# Patient Record
Sex: FEMALE | Race: WHITE | Employment: FULL TIME | URBAN - METROPOLITAN AREA
[De-identification: names, ages, dates, MRNs, and addresses within clinical notes are randomized per-mention and may not be internally consistent; named-entity substitution may affect disease eponyms.]

---

## 2022-09-07 ENCOUNTER — OFFICE VISIT (OUTPATIENT)
Dept: OCCUPATIONAL MEDICINE | Age: 47
End: 2022-09-07

## 2022-09-07 VITALS
TEMPERATURE: 98.4 F | DIASTOLIC BLOOD PRESSURE: 82 MMHG | RESPIRATION RATE: 16 BRPM | SYSTOLIC BLOOD PRESSURE: 124 MMHG | HEART RATE: 54 BPM | OXYGEN SATURATION: 99 %

## 2022-09-07 DIAGNOSIS — J31.0 RHINOSINUSITIS: ICD-10-CM

## 2022-09-07 DIAGNOSIS — U07.1 COVID: Primary | ICD-10-CM

## 2022-09-07 DIAGNOSIS — J32.9 RHINOSINUSITIS: ICD-10-CM

## 2022-09-07 LAB
INFLUENZA A ANTIGEN, POC: NEGATIVE
INFLUENZA B ANTIGEN, POC: NEGATIVE
SARS-COV-2 RNA, POC: POSITIVE

## 2022-09-07 RX ORDER — DIPHENHYDRAMINE HCL 25 MG
25 TABLET ORAL EVERY 6 HOURS PRN
COMMUNITY

## 2022-09-07 RX ORDER — FEXOFENADINE HCL AND PSEUDOEPHEDRINE HCI 60; 120 MG/1; MG/1
1 TABLET, EXTENDED RELEASE ORAL 2 TIMES DAILY
COMMUNITY
Start: 2022-06-01 | End: 2022-09-07

## 2022-09-07 RX ORDER — IBUPROFEN 200 MG
200 TABLET ORAL EVERY 6 HOURS PRN
COMMUNITY

## 2022-09-07 ASSESSMENT — ENCOUNTER SYMPTOMS
ABDOMINAL PAIN: 0
WHEEZING: 0
SHORTNESS OF BREATH: 0
VOMITING: 0
EYE ITCHING: 0
SINUS PRESSURE: 1
SINUS PAIN: 1
SWOLLEN GLANDS: 1
NAUSEA: 1
COUGH: 1
RHINORRHEA: 1
DIARRHEA: 0
EYE PAIN: 0
EYE DISCHARGE: 0
EYE REDNESS: 0
SORE THROAT: 1

## 2022-09-07 NOTE — PROGRESS NOTES
and vomiting. Genitourinary:  Negative for dysuria. Musculoskeletal:  Negative for joint pain and neck pain. Skin:  Negative for rash. Neurological:  Positive for light-headedness and headaches. Negative for dizziness. OBJECTIVE:  /82 (Site: Left Upper Arm, Position: Sitting, Cuff Size: Medium Adult)   Pulse 54   Temp 98.4 °F (36.9 °C) (Oral)   Resp 16   SpO2 99%      Results for orders placed or performed in visit on 09/07/22   AMB POC SARS-COV-2 AND INFLUENZA A/B   Result Value Ref Range    SARS-COV-2 RNA, POC Positive     Influenza A Antigen, POC Negative Negative    Influenza B Antigen, POC Negative Negative       Physical Exam  Vitals reviewed. Constitutional:       General: She is awake. She is not in acute distress. Appearance: Normal appearance. She is well-developed and well-groomed. HENT:      Head: Normocephalic. Right Ear: Hearing and external ear normal. A middle ear effusion is present. Left Ear: Hearing and external ear normal. A middle ear effusion is present. Nose: Mucosal edema present. Right Turbinates: Swollen. Left Turbinates: Swollen. Right Sinus: No maxillary sinus tenderness or frontal sinus tenderness. Left Sinus: No maxillary sinus tenderness or frontal sinus tenderness. Mouth/Throat:      Lips: Pink. No lesions. Mouth: Mucous membranes are moist.      Pharynx: Uvula midline. Pharyngeal swelling and posterior oropharyngeal erythema present. Tonsils: No tonsillar exudate. Eyes:      General: Lids are normal.      Conjunctiva/sclera: Conjunctivae normal.   Neck:      Trachea: No tracheal deviation. Cardiovascular:      Rate and Rhythm: Normal rate and regular rhythm. Heart sounds: Normal heart sounds. Pulmonary:      Effort: Pulmonary effort is normal.      Breath sounds: Normal breath sounds. Musculoskeletal:      Cervical back: Neck supple.    Lymphadenopathy:      Head:      Right side of head: Submandibular adenopathy present. No submental, tonsillar, preauricular, posterior auricular or occipital adenopathy. Left side of head: Submandibular adenopathy present. No submental, tonsillar, preauricular, posterior auricular or occipital adenopathy. Neurological:      Mental Status: She is alert and oriented to person, place, and time. Psychiatric:         Behavior: Behavior is cooperative. ASSESSMENT and PLAN    Beatriz was seen today for uri. Diagnoses and all orders for this visit:    COVID  Comments:  Encouraged rest, hydration, OTC cold medications per packaging, and if needed may contact PCP or an urgent care for further treatement options if needed    Rhinosinusitis  -     AMB POC SARS-COV-2 AND INFLUENZA A/B    Discussed that HR notification of result is necessary for medical surveillance during a pandemic state  in order to complete proper disinfection techniques and contact tracing processes if results are positive    Counseled on benefits of having a primary care provider which includes, but is not limited to, continuity of care and having a medical home when concerns arise. Also enforced that onsite clinic policy states that we are not to take the place of a primary care provider, pt verbalized understanding. SEs and risk vs benefits associated with medications prescribed discussed with patient who verbalized understanding. Pt verbalized understanding and agreement with plan of care. RTC for persisting/worsening symptoms or new complaints that arise. Discussed signs and symptoms that would warrant immediate evaluation including, but not limited to HA, blurred vision, speech disturbance, difficulty with ambulation/gait, numbness, tingling, weakness, syncope, chest pain, or shortness of breath. I have reviewed the patient's medication list, past medical, family, social, and surgical history in detail and updated the patient record appropriately.     Siobhan Began, APRN - CNP

## 2022-09-21 ENCOUNTER — OFFICE VISIT (OUTPATIENT)
Dept: OCCUPATIONAL MEDICINE | Age: 47
End: 2022-09-21

## 2022-09-21 DIAGNOSIS — Z20.822 COVID-19 RULED OUT BY LABORATORY TESTING: Primary | ICD-10-CM

## 2022-09-21 LAB
INFLUENZA A ANTIGEN, POC: NEGATIVE
INFLUENZA B ANTIGEN, POC: NEGATIVE
SARS-COV-2 RNA, POC: NEGATIVE

## 2022-12-09 ENCOUNTER — OFFICE VISIT (OUTPATIENT)
Dept: OCCUPATIONAL MEDICINE | Age: 47
End: 2022-12-09

## 2022-12-09 VITALS
TEMPERATURE: 98.3 F | RESPIRATION RATE: 16 BRPM | SYSTOLIC BLOOD PRESSURE: 134 MMHG | OXYGEN SATURATION: 99 % | DIASTOLIC BLOOD PRESSURE: 84 MMHG | HEART RATE: 88 BPM

## 2022-12-09 DIAGNOSIS — M54.50 ACUTE RIGHT-SIDED LOW BACK PAIN WITHOUT SCIATICA: Primary | ICD-10-CM

## 2022-12-09 LAB
BILIRUBIN, URINE, POC: NEGATIVE
BLOOD URINE, POC: NEGATIVE
GLUCOSE URINE, POC: NEGATIVE
KETONES, URINE, POC: NEGATIVE
LEUKOCYTE ESTERASE, URINE, POC: NEGATIVE
NITRITE, URINE, POC: NEGATIVE
PH, URINE, POC: 5 (ref 4.6–8)
PROTEIN,URINE, POC: NEGATIVE
SPECIFIC GRAVITY, URINE, POC: 1.03 (ref 1–1.03)
URINALYSIS CLARITY, POC: CLEAR
URINALYSIS COLOR, POC: NORMAL
UROBILINOGEN, POC: NORMAL

## 2022-12-09 RX ORDER — CYCLOBENZAPRINE HCL 5 MG
5 TABLET ORAL NIGHTLY PRN
Qty: 3 TABLET | Refills: 0 | Status: SHIPPED | OUTPATIENT
Start: 2022-12-09 | End: 2022-12-12

## 2022-12-09 RX ORDER — NAPROXEN 500 MG/1
500 TABLET ORAL 2 TIMES DAILY PRN
Qty: 30 TABLET | Refills: 0 | Status: SHIPPED | OUTPATIENT
Start: 2022-12-09

## 2022-12-09 ASSESSMENT — ENCOUNTER SYMPTOMS
ABDOMINAL PAIN: 0
BACK PAIN: 1
BOWEL INCONTINENCE: 0

## 2022-12-09 NOTE — PROGRESS NOTES
PROGRESS NOTE    SUBJECTIVE:   Yoselin Schmidt is a 49 y.o. female seen for low back pain that started Wednesday. States that the day prior to her pain starting she was carrying the [employer] and helping to set up the work [employer]. Describes pain as a constant ache with a catching feeling with certain movements. Denies fever, chills, CP, SHOB, HA, N/V, numbness/tingling/weakness in face/extremities, stumbling/falls with ambulation since injury, visual changes, UTI symptoms, or new bowel/bladder incontinence. Chief Complaint    Lower Back Pain         Back Pain  This is a new problem. Episode onset: 2 days. The problem has been gradually worsening since onset. The pain is present in the gluteal and lumbar spine. The pain is moderate. The symptoms are aggravated by sitting and standing. Stiffness is present All day. Pertinent negatives include no abdominal pain, bladder incontinence, bowel incontinence, chest pain, dysuria, fever, headaches, leg pain, numbness, paresis, paresthesias, pelvic pain, perianal numbness, tingling, weakness or weight loss. She has tried NSAIDs for the symptoms. The treatment provided mild relief. Current Outpatient Medications   Medication Sig Dispense Refill    naproxen (NAPROSYN) 500 MG tablet Take 1 tablet by mouth 2 times daily as needed for Pain 30 tablet 0    cyclobenzaprine (FLEXERIL) 5 MG tablet Take 1 tablet by mouth nightly as needed for Muscle spasms 3 tablet 0    ibuprofen (ADVIL;MOTRIN) 200 MG tablet Take 200 mg by mouth every 6 hours as needed for Pain      diphenhydrAMINE (BENADRYL) 25 MG tablet Take 25 mg by mouth every 6 hours as needed for Itching       No current facility-administered medications for this visit.       Allergies   Allergen Reactions    Penicillins Hives    Codeine Other (See Comments) and Palpitations     Other reaction(s): Unknown (comments)       Social History     Tobacco Use    Smoking status: Light Smoker    Smokeless tobacco: Never    Tobacco comments:     Quit smoking: 10 cigs/week   Substance Use Topics    Alcohol use: Yes    Drug use: No        Review of Systems   Constitutional:  Negative for chills, fatigue, fever and weight loss. Eyes:  Negative for visual disturbance. Cardiovascular:  Negative for chest pain. Gastrointestinal:  Negative for abdominal pain and bowel incontinence. Genitourinary:  Negative for bladder incontinence, dysuria and pelvic pain. Musculoskeletal:  Positive for back pain. Negative for arthralgias and myalgias. Neurological:  Negative for dizziness, tingling, weakness, light-headedness, numbness, headaches and paresthesias. OBJECTIVE:  /84 (Site: Left Upper Arm, Position: Sitting, Cuff Size: Medium Adult)   Pulse 88   Temp 98.3 °F (36.8 °C) (Oral)   Resp 16   SpO2 99%      Results for orders placed or performed in visit on 12/09/22   AMB POC URINALYSIS DIP STICK MANUAL W/O MICRO   Result Value Ref Range    Color (UA POC) Beatriz     Clarity (UA POC) Clear     Glucose, Urine, POC Negative Negative    Bilirubin, Urine, POC Negative Negative    Ketones, Urine, POC Negative Negative    Specific Gravity, Urine, POC 1.030 1.001 - 1.035    Blood (UA POC) Negative Negative    pH, Urine, POC 5.0 4.6 - 8.0    Protein, Urine, POC Negative Negative    Urobilinogen, POC 0.2 mg/dL     Nitrite, Urine, POC Negative Negative    Leukocyte Esterase, Urine, POC Negative Negative       Physical Exam  Vitals reviewed. Constitutional:       General: She is awake. She is not in acute distress. Appearance: Normal appearance. She is well-developed and well-groomed. HENT:      Head: Normocephalic. Eyes:      Pupils: Pupils are equal, round, and reactive to light. Pupils are equal.      Right eye: Pupil is not sluggish. Left eye: Pupil is not sluggish. Funduscopic exam:     Right eye: Red reflex present. Left eye: Red reflex present.   Cardiovascular:      Rate and Rhythm: Normal rate and regular rhythm. Heart sounds: Normal heart sounds. Pulmonary:      Effort: Pulmonary effort is normal.      Breath sounds: Normal breath sounds. Musculoskeletal:      Cervical back: Neck supple. No swelling, edema, deformity, erythema, signs of trauma, lacerations, rigidity, spasms, torticollis, tenderness, bony tenderness or crepitus. No pain with movement. Normal range of motion. Thoracic back: No swelling, edema, deformity, signs of trauma, lacerations, spasms, tenderness or bony tenderness. Normal range of motion. No scoliosis. Lumbar back: Spasms and tenderness present. No swelling, edema, deformity, signs of trauma, lacerations or bony tenderness. Normal range of motion. Negative right straight leg raise test and negative left straight leg raise test. No scoliosis. Comments: low back musculature contracted on right side, area is TTP; negative straight leg raise; ROM intact, pain with forward flexion, lateral flexion, and lateral rotation; no pain with palpation, crepitus, or misalignment noted over IS joints or bilateral trochanters   Skin:     General: Skin is warm and dry. Neurological:      Mental Status: She is alert and oriented to person, place, and time. Sensory: Sensation is intact. Motor: Motor function is intact. No weakness. Coordination: Coordination is intact. Coordination normal.      Gait: Gait is intact. Gait normal.   Psychiatric:         Behavior: Behavior is cooperative. ASSESSMENT and PLAN    Beatriz was seen today for lower back pain. Diagnoses and all orders for this visit:    Acute right-sided low back pain without sciatica  Comments:  Naproxen and Flexeril as presribed DO NOT DRIVE, OPERATE HEAVY MACHINERY, OR DRINK ALCOHOL WHILE TAKING*   Orders:  -     naproxen (NAPROSYN) 500 MG tablet; Take 1 tablet by mouth 2 times daily as needed for Pain  -     cyclobenzaprine (FLEXERIL) 5 MG tablet;  Take 1 tablet by mouth nightly as needed for Muscle spasms  -     AMB POC URINALYSIS DIP STICK MANUAL W/O MICRO    Encouraged continuing RICE and/or heat therapy, lumbar support, massage therapy, and OTC muscle rub use for symptom management. Back stretching and strengthening exercises provided. Discussed proper body mechanics to prevent further injury of the musculature    Counseled on benefits of having a primary care provider which includes, but is not limited to, continuity of care and having a medical home when concerns arise. Also enforced that onsite clinic policy states that we are not to take the place of a primary care provider, pt verbalized understanding. SEs and risk vs benefits associated with medications prescribed discussed with patient who verbalized understanding. Pt verbalized understanding and agreement with plan of care. RTC for persisting/worsening symptoms or new complaints that arise. Discussed signs and symptoms that would warrant immediate evaluation including, but not limited to HA, blurred vision, speech disturbance, difficulty with ambulation/gait, numbness, tingling, weakness, syncope, chest pain, or shortness of breath. I have reviewed the patient's medication list, past medical, family, social, and surgical history in detail and updated the patient record appropriately.     Reji Aquino, APRN - CNP

## 2023-01-30 ENCOUNTER — OFFICE VISIT (OUTPATIENT)
Dept: OCCUPATIONAL MEDICINE | Age: 48
End: 2023-01-30

## 2023-01-30 VITALS
OXYGEN SATURATION: 99 % | RESPIRATION RATE: 16 BRPM | DIASTOLIC BLOOD PRESSURE: 72 MMHG | TEMPERATURE: 98.2 F | SYSTOLIC BLOOD PRESSURE: 116 MMHG | HEART RATE: 73 BPM

## 2023-01-30 DIAGNOSIS — R51.9 ACUTE NONINTRACTABLE HEADACHE, UNSPECIFIED HEADACHE TYPE: Primary | ICD-10-CM

## 2023-01-30 ASSESSMENT — ENCOUNTER SYMPTOMS
VOMITING: 0
SINUS PRESSURE: 0
SORE THROAT: 0
EYE REDNESS: 0
EYE PAIN: 0
EYE ITCHING: 0
SHORTNESS OF BREATH: 0
NAUSEA: 0
RHINORRHEA: 0
SINUS PAIN: 0
DIARRHEA: 0
EYE DISCHARGE: 0

## 2023-01-30 NOTE — PROGRESS NOTES
PROGRESS NOTE    SUBJECTIVE:   Saúl Ballard is a 49 y.o. female seen for COVID testing as she has had a headache and started to [employer]" today which are similar symptoms to what she experienced with COVID. Multiple coworkers out with [place] recently and desires testing. Feels like her sense of smell is still impaired since having COVID so is unsure if this is from lingering post-COVID symptoms or a sign of a potential new infection. Chief Complaint    Concern For COVID-19         Headache  Quality:  Dull  Laterality:  Both sides at the same time  Aggravating factors:  None  Allodynia triggers:  None  Changes in vision:  None    Current Outpatient Medications   Medication Sig Dispense Refill    diphenhydrAMINE (BENADRYL) 25 MG tablet Take 25 mg by mouth every 6 hours as needed for Itching       No current facility-administered medications for this visit. Allergies   Allergen Reactions    Penicillins Hives    Codeine Other (See Comments) and Palpitations     Other reaction(s): Unknown (comments)       Social History     Tobacco Use    Smoking status: Light Smoker    Smokeless tobacco: Never    Tobacco comments:     Quit smoking: 10 cigs/week   Substance Use Topics    Alcohol use: Yes    Drug use: No        Review of Systems   Constitutional:  Negative for chills and fatigue. HENT:  Negative for congestion, ear pain, postnasal drip, rhinorrhea, sinus pressure, sinus pain, sneezing and sore throat. Eyes:  Negative for pain, discharge, redness and itching. Respiratory:  Negative for shortness of breath. Cardiovascular:  Negative for chest pain. Gastrointestinal:  Negative for diarrhea, nausea and vomiting. Musculoskeletal:  Negative for arthralgias and myalgias. Neurological:  Positive for headaches. Negative for dizziness and light-headedness.         OBJECTIVE:  /72 (Site: Left Upper Arm, Position: Sitting, Cuff Size: Medium Adult)   Pulse 73   Temp 98.2 °F (36.8 °C) (Oral)   Resp 16 SpO2 99%      Results for orders placed or performed in visit on 01/30/23   AMB POC SARS-COV-2 AND INFLUENZA A/B   Result Value Ref Range    SARS-COV-2 RNA, POC Negative     Influenza A Antigen, POC Negative Negative    Influenza B Antigen, POC Negative Negative       Physical Exam  Vitals reviewed. Constitutional:       General: She is awake. She is not in acute distress. Appearance: Normal appearance. She is well-developed and well-groomed. HENT:      Head: Normocephalic. Right Ear: Hearing, ear canal and external ear normal. No drainage, swelling or tenderness. No middle ear effusion. Left Ear: Hearing, ear canal and external ear normal. No drainage, swelling or tenderness. No middle ear effusion. Nose: No mucosal edema or rhinorrhea. Right Turbinates: Not swollen or pale. Left Turbinates: Not swollen or pale. Right Sinus: No maxillary sinus tenderness or frontal sinus tenderness. Left Sinus: No maxillary sinus tenderness or frontal sinus tenderness. Mouth/Throat:      Mouth: Mucous membranes are moist.      Pharynx: Oropharynx is clear. Uvula midline. No pharyngeal swelling or posterior oropharyngeal erythema. Tonsils: No tonsillar exudate. Comments: PND with cobblestoning  Eyes:      General: Lids are normal. No allergic shiner. Conjunctiva/sclera: Conjunctivae normal.      Right eye: Right conjunctiva is not injected. No chemosis. Left eye: Left conjunctiva is not injected. No chemosis. Neck:      Thyroid: No thyromegaly. Trachea: Trachea normal.   Cardiovascular:      Rate and Rhythm: Normal rate and regular rhythm. Heart sounds: Normal heart sounds. Pulmonary:      Effort: Pulmonary effort is normal.      Breath sounds: Normal breath sounds. Musculoskeletal:      Cervical back: Normal range of motion and neck supple.    Lymphadenopathy:      Head:      Right side of head: No submental, submandibular, tonsillar, preauricular, posterior auricular or occipital adenopathy. Left side of head: No submental, submandibular, tonsillar, preauricular, posterior auricular or occipital adenopathy. Skin:     General: Skin is warm and dry. Neurological:      Mental Status: She is alert and oriented to person, place, and time. Psychiatric:         Behavior: Behavior is cooperative. ASSESSMENT and PLAN    Beatriz was seen today for concern for covid-19. Diagnoses and all orders for this visit:    Acute nonintractable headache, unspecified headache type  Comments:  COVID/flu negative at this time. Encouraged to monitor for changes in her symptoms and RTC as needed  Orders:  -     AMB POC SARS-COV-2 AND INFLUENZA A/B      Counseled on benefits of having a primary care provider which includes, but is not limited to, continuity of care and having a medical home when concerns arise. Also enforced that onsite clinic policy states that we are not to take the place of a primary care provider, pt verbalized understanding. SEs and risk vs benefits associated with medications prescribed discussed with patient who verbalized understanding. Pt verbalized understanding and agreement with plan of care. RTC for persisting/worsening symptoms or new complaints that arise. Discussed signs and symptoms that would warrant immediate evaluation including, but not limited to HA, blurred vision, speech disturbance, difficulty with ambulation/gait, numbness, tingling, weakness, syncope, chest pain, or shortness of breath. I have reviewed the patient's medication list, past medical, family, social, and surgical history in detail and updated the patient record appropriately.     Ziggy Lane, RADHA - CNP

## 2023-02-21 ENCOUNTER — OFFICE VISIT (OUTPATIENT)
Dept: OCCUPATIONAL MEDICINE | Age: 48
End: 2023-02-21

## 2023-02-21 VITALS
DIASTOLIC BLOOD PRESSURE: 80 MMHG | HEART RATE: 82 BPM | OXYGEN SATURATION: 98 % | SYSTOLIC BLOOD PRESSURE: 136 MMHG | RESPIRATION RATE: 16 BRPM | TEMPERATURE: 98.2 F

## 2023-02-21 DIAGNOSIS — J30.9 ALLERGIC RHINITIS WITH POSTNASAL DRIP: ICD-10-CM

## 2023-02-21 DIAGNOSIS — J32.9 RHINOSINUSITIS: Primary | ICD-10-CM

## 2023-02-21 DIAGNOSIS — R09.82 ALLERGIC RHINITIS WITH POSTNASAL DRIP: ICD-10-CM

## 2023-02-21 DIAGNOSIS — J31.0 RHINOSINUSITIS: Primary | ICD-10-CM

## 2023-02-21 LAB
GROUP A STREP ANTIGEN, POC: NEGATIVE
INFLUENZA A ANTIGEN, POC: NEGATIVE
INFLUENZA B ANTIGEN, POC: NEGATIVE
SARS-COV-2 RNA, POC: NEGATIVE
VALID INTERNAL CONTROL, POC: YES

## 2023-02-21 ASSESSMENT — ENCOUNTER SYMPTOMS
NAUSEA: 0
CHEST TIGHTNESS: 0
SINUS PAIN: 1
RHINORRHEA: 1
COUGH: 1
EYE REDNESS: 0
SORE THROAT: 1
SWOLLEN GLANDS: 1
EYE ITCHING: 0
ABDOMINAL PAIN: 0
EYE PAIN: 0
DIARRHEA: 1
SHORTNESS OF BREATH: 0
SINUS PRESSURE: 1
WHEEZING: 0
VOMITING: 0
EYE DISCHARGE: 0

## 2023-02-21 NOTE — PROGRESS NOTES
PROGRESS NOTE    SUBJECTIVE:   Tamie Ortega is a 49 y.o. female seen for \"a bad cold\" that started 2 days ago. Denies any known or suspected sick contacts   Chief Complaint    URI         URI   This is a new problem. Episode onset: 2-3 days. The problem has been gradually worsening. There has been no fever. Associated symptoms include congestion, coughing, diarrhea, headaches, rhinorrhea, sinus pain, sneezing, a sore throat and swollen glands. Pertinent negatives include no abdominal pain, chest pain, dysuria, ear pain, joint pain, joint swelling, nausea, neck pain, plugged ear sensation, rash, vomiting or wheezing. Treatments tried: Dayquil, Molly seltzer, Benadryl. The treatment provided mild relief. Current Outpatient Medications   Medication Sig Dispense Refill    diphenhydrAMINE (BENADRYL) 25 MG tablet Take 25 mg by mouth every 6 hours as needed for Itching       No current facility-administered medications for this visit. Allergies   Allergen Reactions    Penicillins Hives    Codeine Other (See Comments) and Palpitations     Other reaction(s): Unknown (comments)       Social History     Tobacco Use    Smoking status: Light Smoker    Smokeless tobacco: Never    Tobacco comments:     Quit smoking: 10 cigs/week   Substance Use Topics    Alcohol use: Yes    Drug use: No        Review of Systems   Constitutional:  Positive for fatigue. Negative for chills and fever. HENT:  Positive for congestion, postnasal drip, rhinorrhea, sinus pressure, sinus pain, sneezing and sore throat. Negative for ear pain. Eyes:  Negative for pain, discharge, redness and itching. Respiratory:  Positive for cough. Negative for chest tightness, shortness of breath and wheezing. Cardiovascular:  Negative for chest pain. Gastrointestinal:  Positive for diarrhea. Negative for abdominal pain, nausea and vomiting. Genitourinary:  Negative for dysuria.    Musculoskeletal:  Negative for arthralgias, joint pain, myalgias and neck pain. Skin:  Negative for rash. Neurological:  Positive for light-headedness and headaches. Negative for dizziness. OBJECTIVE:  /80 (Site: Left Upper Arm, Position: Sitting, Cuff Size: Medium Adult)   Pulse 82   Temp 98.2 °F (36.8 °C) (Oral)   Resp 16   SpO2 98%      Results for orders placed or performed in visit on 02/21/23   AMB POC RAPID STREP A   Result Value Ref Range    Valid Internal Control, POC yes     Group A Strep Antigen, POC Negative Negative   AMB POC SARS-COV-2 AND INFLUENZA A/B   Result Value Ref Range    SARS-COV-2 RNA, POC Negative     Influenza A Antigen, POC Negative Negative    Influenza B Antigen, POC Negative Negative         Physical Exam  Vitals reviewed. Constitutional:       General: She is awake. She is not in acute distress. Appearance: Normal appearance. She is well-developed and well-groomed. HENT:      Head: Normocephalic. Right Ear: Hearing and external ear normal. A middle ear effusion is present. Left Ear: Hearing and external ear normal. A middle ear effusion is present. Nose: Mucosal edema present. Right Turbinates: Swollen. Left Turbinates: Swollen. Right Sinus: No maxillary sinus tenderness or frontal sinus tenderness. Left Sinus: No maxillary sinus tenderness or frontal sinus tenderness. Mouth/Throat:      Lips: Pink. No lesions. Mouth: Mucous membranes are moist.      Pharynx: Uvula midline. Pharyngeal swelling and posterior oropharyngeal erythema present. Tonsils: No tonsillar exudate. Eyes:      General: Lids are normal.      Conjunctiva/sclera: Conjunctivae normal.   Neck:      Trachea: No tracheal deviation. Cardiovascular:      Rate and Rhythm: Normal rate and regular rhythm. Heart sounds: Normal heart sounds. Pulmonary:      Effort: Pulmonary effort is normal.      Breath sounds: Normal breath sounds. Musculoskeletal:      Cervical back: Neck supple.    Lymphadenopathy: Head:      Right side of head: Submandibular adenopathy present. No submental, tonsillar, preauricular, posterior auricular or occipital adenopathy. Left side of head: Submandibular adenopathy present. No submental, tonsillar, preauricular, posterior auricular or occipital adenopathy. Neurological:      Mental Status: She is alert and oriented to person, place, and time. Psychiatric:         Behavior: Behavior is cooperative. ASSESSMENT and PLAN    Beatriz was seen today for uri. Diagnoses and all orders for this visit:    Rhinosinusitis  Comments:  COVID/Flu/strep negative at this time. Encouraged rest, hydration, and OTC therapies per packaging for relief. RTC as needed, may need retesting in 24-48°  Orders:  -     AMB POC RAPID STREP A  -     AMB POC SARS-COV-2 AND INFLUENZA A/B    Allergic rhinitis with postnasal drip  Comments:  Restart daily Loratadine instead of Benadryl for better allergy management with rising tree pollen counts      Counseled on benefits of having a primary care provider which includes, but is not limited to, continuity of care and having a medical home when concerns arise. Also enforced that onsite clinic policy states that we are not to take the place of a primary care provider, pt verbalized understanding. SEs and risk vs benefits associated with medications prescribed discussed with patient who verbalized understanding. Pt verbalized understanding and agreement with plan of care. RTC for persisting/worsening symptoms or new complaints that arise. Discussed signs and symptoms that would warrant immediate evaluation including, but not limited to HA, blurred vision, speech disturbance, difficulty with ambulation/gait, numbness, tingling, weakness, syncope, chest pain, or shortness of breath. I have reviewed the patient's medication list, past medical, family, social, and surgical history in detail and updated the patient record appropriately.     Kianna Ferrera, APRN - CNP

## 2023-08-22 ENCOUNTER — OFFICE VISIT (OUTPATIENT)
Dept: OCCUPATIONAL MEDICINE | Age: 48
End: 2023-08-22

## 2023-08-22 VITALS
HEART RATE: 82 BPM | RESPIRATION RATE: 16 BRPM | DIASTOLIC BLOOD PRESSURE: 74 MMHG | TEMPERATURE: 98.4 F | SYSTOLIC BLOOD PRESSURE: 122 MMHG | OXYGEN SATURATION: 99 %

## 2023-08-22 DIAGNOSIS — J02.9 SORE THROAT: Primary | ICD-10-CM

## 2023-08-22 RX ORDER — LORATADINE 10 MG/1
10 TABLET ORAL DAILY
COMMUNITY

## 2023-08-22 ASSESSMENT — ENCOUNTER SYMPTOMS
EYE DISCHARGE: 0
ABDOMINAL PAIN: 0
DIARRHEA: 0
SWOLLEN GLANDS: 1
RHINORRHEA: 1
COUGH: 0
EYE REDNESS: 0
SORE THROAT: 1
EYE ITCHING: 0
NAUSEA: 0
EYE PAIN: 0
VOMITING: 0
SINUS PRESSURE: 0
SINUS PAIN: 0

## 2024-08-14 ENCOUNTER — OFFICE VISIT (OUTPATIENT)
Age: 49
End: 2024-08-14

## 2024-08-14 VITALS
TEMPERATURE: 98.7 F | DIASTOLIC BLOOD PRESSURE: 70 MMHG | HEART RATE: 66 BPM | OXYGEN SATURATION: 99 % | SYSTOLIC BLOOD PRESSURE: 124 MMHG | RESPIRATION RATE: 16 BRPM

## 2024-08-14 DIAGNOSIS — R55 VASOVAGAL EPISODE: Primary | ICD-10-CM

## 2024-08-14 RX ORDER — VALACYCLOVIR HYDROCHLORIDE 1 G/1
1000 TABLET, FILM COATED ORAL 2 TIMES DAILY
COMMUNITY
Start: 2024-08-01

## 2024-08-14 ASSESSMENT — ENCOUNTER SYMPTOMS
VOMITING: 0
PHOTOPHOBIA: 0
EYE ITCHING: 0
RHINORRHEA: 0
DIARRHEA: 1
FLATUS: 0
EYE DISCHARGE: 0
SINUS PAIN: 0
EYE PAIN: 0
EYE REDNESS: 0
BACK PAIN: 0
ABDOMINAL PAIN: 1
SORE THROAT: 0
BLOATING: 0
RECTAL PAIN: 0
ANAL BLEEDING: 0
COUGH: 0
NAUSEA: 1
BLOOD IN STOOL: 0

## 2024-08-14 NOTE — PROGRESS NOTES
enforced that onsite clinic policy states that we are not to take the place of a primary care provider, pt verbalized understanding.     SEs and risk vs benefits associated with medications prescribed discussed with patient who verbalized understanding. Pt verbalized understanding and agreement with plan of care. RTC for persisting/worsening symptoms or new complaints that arise. Discussed signs and symptoms that would warrant immediate evaluation including, but not limited to HA, blurred vision, speech disturbance, difficulty with ambulation/gait, numbness, tingling, weakness, syncope, chest pain, or shortness of breath.    I have reviewed the patient's medication list, past medical, family, social, and surgical history in detail and updated the patient record appropriately.    Edwardo Hebert, APRN - CNP

## 2025-08-28 ENCOUNTER — APPOINTMENT (OUTPATIENT)
Dept: URBAN - METROPOLITAN AREA CLINIC 23 | Facility: CLINIC | Age: 50
Setting detail: DERMATOLOGY
End: 2025-08-28

## 2025-08-28 DIAGNOSIS — L73.8 OTHER SPECIFIED FOLLICULAR DISORDERS: ICD-10-CM

## 2025-08-28 DIAGNOSIS — D22 MELANOCYTIC NEVI: ICD-10-CM

## 2025-08-28 DIAGNOSIS — Z71.89 OTHER SPECIFIED COUNSELING: ICD-10-CM

## 2025-08-28 DIAGNOSIS — Z41.9 ENCOUNTER FOR PROCEDURE FOR PURPOSES OTHER THAN REMEDYING HEALTH STATE, UNSPECIFIED: ICD-10-CM

## 2025-08-28 DIAGNOSIS — B00.1 HERPESVIRAL VESICULAR DERMATITIS: ICD-10-CM

## 2025-08-28 DIAGNOSIS — L81.4 OTHER MELANIN HYPERPIGMENTATION: ICD-10-CM

## 2025-08-28 PROBLEM — D23.71 OTHER BENIGN NEOPLASM OF SKIN OF RIGHT LOWER LIMB, INCLUDING HIP: Status: ACTIVE | Noted: 2025-08-28

## 2025-08-28 PROBLEM — D23.72 OTHER BENIGN NEOPLASM OF SKIN OF LEFT LOWER LIMB, INCLUDING HIP: Status: ACTIVE | Noted: 2025-08-28

## 2025-08-28 PROBLEM — D22.4 MELANOCYTIC NEVI OF SCALP AND NECK: Status: ACTIVE | Noted: 2025-08-28

## 2025-08-28 PROBLEM — D22.5 MELANOCYTIC NEVI OF TRUNK: Status: ACTIVE | Noted: 2025-08-28

## 2025-08-28 PROCEDURE — ? ADDITIONAL NOTES

## 2025-08-28 PROCEDURE — ? PRESCRIPTION MEDICATION MANAGEMENT

## 2025-08-28 PROCEDURE — ? COUNSELING

## 2025-08-28 ASSESSMENT — LOCATION ZONE DERM
LOCATION ZONE: NECK
LOCATION ZONE: FACE
LOCATION ZONE: TRUNK

## 2025-08-28 ASSESSMENT — LOCATION DETAILED DESCRIPTION DERM
LOCATION DETAILED: RIGHT INFERIOR LATERAL NECK
LOCATION DETAILED: RIGHT CHIN
LOCATION DETAILED: LEFT RIB CAGE
LOCATION DETAILED: RIGHT MID-UPPER BACK
LOCATION DETAILED: RIGHT CENTRAL MALAR CHEEK
LOCATION DETAILED: LEFT MID PREAURICULAR CHEEK
LOCATION DETAILED: LEFT CENTRAL MALAR CHEEK

## 2025-08-28 ASSESSMENT — LOCATION SIMPLE DESCRIPTION DERM
LOCATION SIMPLE: LEFT CHEEK
LOCATION SIMPLE: ABDOMEN
LOCATION SIMPLE: RIGHT CHEEK
LOCATION SIMPLE: RIGHT ANTERIOR NECK
LOCATION SIMPLE: RIGHT UPPER BACK
LOCATION SIMPLE: CHIN